# Patient Record
Sex: MALE | Race: WHITE
[De-identification: names, ages, dates, MRNs, and addresses within clinical notes are randomized per-mention and may not be internally consistent; named-entity substitution may affect disease eponyms.]

---

## 2017-10-04 ENCOUNTER — HOSPITAL ENCOUNTER (EMERGENCY)
Dept: HOSPITAL 8 - ED | Age: 57
Discharge: HOME | End: 2017-10-04
Payer: MEDICARE

## 2017-10-04 VITALS — HEIGHT: 73 IN | WEIGHT: 178.79 LBS | BODY MASS INDEX: 23.7 KG/M2

## 2017-10-04 VITALS — SYSTOLIC BLOOD PRESSURE: 130 MMHG | DIASTOLIC BLOOD PRESSURE: 77 MMHG

## 2017-10-04 DIAGNOSIS — F10.239: Primary | ICD-10-CM

## 2017-10-04 DIAGNOSIS — K70.10: ICD-10-CM

## 2017-10-04 DIAGNOSIS — F19.20: ICD-10-CM

## 2017-10-04 DIAGNOSIS — B18.2: ICD-10-CM

## 2017-10-04 LAB
AST SERPL-CCNC: 293 U/L (ref 15–37)
BUN SERPL-MCNC: 12 MG/DL (ref 7–18)
HCT VFR BLD CALC: 42.7 % (ref 39.2–51.8)
HGB BLD-MCNC: 14.9 G/DL (ref 13.7–18)
WBC # BLD AUTO: 6.1 X10^3/UL (ref 3.4–10)

## 2017-10-04 PROCEDURE — G0479 DRUG TEST PRESUMP NOT OPT: HCPCS

## 2017-10-04 PROCEDURE — 80053 COMPREHEN METABOLIC PANEL: CPT

## 2017-10-04 PROCEDURE — 80307 DRUG TEST PRSMV CHEM ANLYZR: CPT

## 2017-10-04 PROCEDURE — 36415 COLL VENOUS BLD VENIPUNCTURE: CPT

## 2017-10-04 PROCEDURE — 85025 COMPLETE CBC W/AUTO DIFF WBC: CPT

## 2017-10-04 PROCEDURE — 99284 EMERGENCY DEPT VISIT MOD MDM: CPT

## 2017-10-25 ENCOUNTER — HOSPITAL ENCOUNTER (EMERGENCY)
Dept: HOSPITAL 8 - ED | Age: 57
Discharge: HOME | End: 2017-10-25
Payer: MEDICARE

## 2017-10-25 VITALS — SYSTOLIC BLOOD PRESSURE: 147 MMHG | DIASTOLIC BLOOD PRESSURE: 89 MMHG

## 2017-10-25 DIAGNOSIS — G89.29: ICD-10-CM

## 2017-10-25 DIAGNOSIS — R10.9: ICD-10-CM

## 2017-10-25 DIAGNOSIS — F10.220: Primary | ICD-10-CM

## 2017-10-25 LAB
AST SERPL-CCNC: 225 U/L (ref 15–37)
BUN SERPL-MCNC: 10 MG/DL (ref 7–18)
HCT VFR BLD CALC: 44.3 % (ref 39.2–51.8)
HGB BLD-MCNC: 15.2 G/DL (ref 13.7–18)
WBC # BLD AUTO: 5.2 X10^3/UL (ref 3.4–10)

## 2017-10-25 PROCEDURE — 80053 COMPREHEN METABOLIC PANEL: CPT

## 2017-10-25 PROCEDURE — 83690 ASSAY OF LIPASE: CPT

## 2017-10-25 PROCEDURE — 99285 EMERGENCY DEPT VISIT HI MDM: CPT

## 2017-10-25 PROCEDURE — 96372 THER/PROPH/DIAG INJ SC/IM: CPT

## 2017-10-25 PROCEDURE — 85610 PROTHROMBIN TIME: CPT

## 2017-10-25 PROCEDURE — 36415 COLL VENOUS BLD VENIPUNCTURE: CPT

## 2017-10-25 PROCEDURE — 85025 COMPLETE CBC W/AUTO DIFF WBC: CPT

## 2017-10-25 PROCEDURE — 74020: CPT

## 2018-06-11 ENCOUNTER — HOSPITAL ENCOUNTER (EMERGENCY)
Dept: HOSPITAL 8 - ED | Age: 58
Discharge: HOME | End: 2018-06-11
Payer: MEDICARE

## 2018-06-11 VITALS — SYSTOLIC BLOOD PRESSURE: 146 MMHG | DIASTOLIC BLOOD PRESSURE: 90 MMHG

## 2018-06-11 VITALS — WEIGHT: 171.96 LBS | BODY MASS INDEX: 22.79 KG/M2 | HEIGHT: 73 IN

## 2018-06-11 VITALS — SYSTOLIC BLOOD PRESSURE: 110 MMHG | DIASTOLIC BLOOD PRESSURE: 67 MMHG

## 2018-06-11 VITALS — BODY MASS INDEX: 25.46 KG/M2 | WEIGHT: 198.42 LBS | HEIGHT: 74 IN

## 2018-06-11 DIAGNOSIS — F10.120: Primary | ICD-10-CM

## 2018-06-11 DIAGNOSIS — Z00.00: ICD-10-CM

## 2018-06-11 DIAGNOSIS — F10.229: Primary | ICD-10-CM

## 2018-06-11 LAB
ALBUMIN SERPL-MCNC: 3.1 G/DL (ref 3.4–5)
ALP SERPL-CCNC: 155 U/L (ref 45–117)
ALT SERPL-CCNC: 84 U/L (ref 12–78)
ANION GAP SERPL CALC-SCNC: 8 MMOL/L (ref 5–15)
BASOPHILS # BLD AUTO: 0.01 X10^3/UL (ref 0–0.1)
BASOPHILS NFR BLD AUTO: 0 % (ref 0–1)
BILIRUB SERPL-MCNC: 2.7 MG/DL (ref 0.2–1)
CALCIUM SERPL-MCNC: 8.1 MG/DL (ref 8.5–10.1)
CHLORIDE SERPL-SCNC: 104 MMOL/L (ref 98–107)
CREAT SERPL-MCNC: 0.79 MG/DL (ref 0.7–1.3)
EOSINOPHIL # BLD AUTO: 0.03 X10^3/UL (ref 0–0.4)
EOSINOPHIL NFR BLD AUTO: 1 % (ref 1–7)
ERYTHROCYTE [DISTWIDTH] IN BLOOD BY AUTOMATED COUNT: 18.3 % (ref 9.4–14.8)
HEMOGRAM NOTE: (no result)
LYMPHOCYTES # BLD AUTO: 0.86 X10^3/UL (ref 1–3.4)
LYMPHOCYTES NFR BLD AUTO: 29 % (ref 22–44)
MCH RBC QN AUTO: 27.8 PG (ref 27.5–34.5)
MCHC RBC AUTO-ENTMCNC: 33.9 G/DL (ref 33.2–36.2)
MCV RBC AUTO: 81.8 FL (ref 81–97)
MD: (no result)
MONOCYTES # BLD AUTO: 0.19 X10^3/UL (ref 0.2–0.8)
MONOCYTES NFR BLD AUTO: 6 % (ref 2–9)
NEUTROPHILS # BLD AUTO: 1.87 X10^3/UL (ref 1.8–6.8)
NEUTROPHILS NFR BLD AUTO: 63 % (ref 42–75)
PLATELET # BLD AUTO: 56 X10^3/UL (ref 130–400)
PMV BLD AUTO: 7.4 FL (ref 7.4–10.4)
PROT SERPL-MCNC: 7.7 G/DL (ref 6.4–8.2)
RBC # BLD AUTO: 4.2 X10^6/UL (ref 4.38–5.82)

## 2018-06-11 PROCEDURE — 36415 COLL VENOUS BLD VENIPUNCTURE: CPT

## 2018-06-11 PROCEDURE — 99284 EMERGENCY DEPT VISIT MOD MDM: CPT

## 2018-06-11 PROCEDURE — 99283 EMERGENCY DEPT VISIT LOW MDM: CPT

## 2018-06-11 PROCEDURE — 80307 DRUG TEST PRSMV CHEM ANLYZR: CPT

## 2018-06-11 PROCEDURE — 85025 COMPLETE CBC W/AUTO DIFF WBC: CPT

## 2018-06-11 PROCEDURE — 80053 COMPREHEN METABOLIC PANEL: CPT

## 2019-07-03 ENCOUNTER — HOSPITAL ENCOUNTER (INPATIENT)
Dept: HOSPITAL 8 - ED | Age: 59
Discharge: LEFT BEFORE DISCHARGE | DRG: 432 | End: 2019-07-03
Attending: INTERNAL MEDICINE | Admitting: INTERNAL MEDICINE
Payer: MEDICARE

## 2019-07-03 VITALS — SYSTOLIC BLOOD PRESSURE: 148 MMHG | DIASTOLIC BLOOD PRESSURE: 93 MMHG

## 2019-07-03 VITALS — DIASTOLIC BLOOD PRESSURE: 74 MMHG | SYSTOLIC BLOOD PRESSURE: 116 MMHG

## 2019-07-03 VITALS — BODY MASS INDEX: 21.69 KG/M2 | HEIGHT: 76 IN | WEIGHT: 178.13 LBS

## 2019-07-03 DIAGNOSIS — K70.30: ICD-10-CM

## 2019-07-03 DIAGNOSIS — D61.818: ICD-10-CM

## 2019-07-03 DIAGNOSIS — Z87.891: ICD-10-CM

## 2019-07-03 DIAGNOSIS — D68.4: ICD-10-CM

## 2019-07-03 DIAGNOSIS — Z53.21: ICD-10-CM

## 2019-07-03 DIAGNOSIS — E44.1: ICD-10-CM

## 2019-07-03 DIAGNOSIS — F10.239: ICD-10-CM

## 2019-07-03 DIAGNOSIS — B19.20: ICD-10-CM

## 2019-07-03 DIAGNOSIS — K70.40: Primary | ICD-10-CM

## 2019-07-03 DIAGNOSIS — G92: ICD-10-CM

## 2019-07-03 DIAGNOSIS — B19.10: ICD-10-CM

## 2019-07-03 LAB
<PLATELET ESTIMATE>: (no result)
<PLT MORPHOLOGY>: (no result)
ALBUMIN SERPL-MCNC: 2.7 G/DL (ref 3.4–5)
ALP SERPL-CCNC: 138 U/L (ref 45–117)
ALT SERPL-CCNC: 79 U/L (ref 12–78)
ANION GAP SERPL CALC-SCNC: 5 MMOL/L (ref 5–15)
APTT BLD: 29 SECONDS (ref 25–31)
BASOPHILS NFR BLD MANUAL: 1 % (ref 0–1)
BASOS#(MANUAL): 0.03 X10^3/UL (ref 0–0.1)
BILIRUB DIRECT SERPL-MCNC: NORMAL MG/DL
BILIRUB SERPL-MCNC: 1.1 MG/DL (ref 0.2–1)
CALCIUM SERPL-MCNC: 8.6 MG/DL (ref 8.5–10.1)
CHLORIDE SERPL-SCNC: 107 MMOL/L (ref 98–107)
CREAT SERPL-MCNC: 0.81 MG/DL (ref 0.7–1.3)
CULTURE INDICATED?: NO
EOS#(MANUAL): 0.05 X10^3/UL (ref 0–0.4)
EOS% (MANUAL): 2 % (ref 1–7)
ERYTHROCYTE [DISTWIDTH] IN BLOOD BY AUTOMATED COUNT: 16.8 % (ref 9.4–14.8)
INR PPP: 1.17 (ref 0.93–1.1)
LYMPH#(MANUAL): 0.76 X10^3/UL (ref 1–3.4)
LYMPHS% (MANUAL): 28 % (ref 22–44)
MCH RBC QN AUTO: 30.5 PG (ref 27.5–34.5)
MCHC RBC AUTO-ENTMCNC: 33.5 G/DL (ref 33.2–36.2)
MCV RBC AUTO: 91.1 FL (ref 81–97)
MD: YES
MICROSCOPIC: (no result)
MONOS#(MANUAL): 0.35 X10^3/UL (ref 0.3–2.7)
MONOS% (MANUAL): 13 % (ref 2–9)
PLATELET # BLD AUTO: 77 X10^3/UL (ref 130–400)
PMV BLD AUTO: 7.1 FL (ref 7.4–10.4)
PROT SERPL-MCNC: 6.9 G/DL (ref 6.4–8.2)
PROTHROMBIN TIME: 12.2 SECONDS (ref 9.6–11.5)
RBC # BLD AUTO: 4.32 X10^6/UL (ref 4.38–5.82)
SEG#(MANUAL): 1.51 X10^3/UL (ref 1.8–6.8)
SEGS% (MANUAL): 56 % (ref 42–75)

## 2019-07-03 PROCEDURE — 85025 COMPLETE CBC W/AUTO DIFF WBC: CPT

## 2019-07-03 PROCEDURE — 71045 X-RAY EXAM CHEST 1 VIEW: CPT

## 2019-07-03 PROCEDURE — 80053 COMPREHEN METABOLIC PANEL: CPT

## 2019-07-03 PROCEDURE — 83735 ASSAY OF MAGNESIUM: CPT

## 2019-07-03 PROCEDURE — 82140 ASSAY OF AMMONIA: CPT

## 2019-07-03 PROCEDURE — 83690 ASSAY OF LIPASE: CPT

## 2019-07-03 PROCEDURE — 76700 US EXAM ABDOM COMPLETE: CPT

## 2019-07-03 PROCEDURE — 93005 ELECTROCARDIOGRAM TRACING: CPT

## 2019-07-03 PROCEDURE — 85730 THROMBOPLASTIN TIME PARTIAL: CPT

## 2019-07-03 PROCEDURE — 70450 CT HEAD/BRAIN W/O DYE: CPT

## 2019-07-03 PROCEDURE — 83605 ASSAY OF LACTIC ACID: CPT

## 2019-07-03 PROCEDURE — 82962 GLUCOSE BLOOD TEST: CPT

## 2019-07-03 PROCEDURE — 81003 URINALYSIS AUTO W/O SCOPE: CPT

## 2019-07-03 PROCEDURE — 99285 EMERGENCY DEPT VISIT HI MDM: CPT

## 2019-07-03 PROCEDURE — 85610 PROTHROMBIN TIME: CPT

## 2019-07-03 PROCEDURE — 80307 DRUG TEST PRSMV CHEM ANLYZR: CPT

## 2019-07-03 PROCEDURE — 36415 COLL VENOUS BLD VENIPUNCTURE: CPT

## 2019-07-03 NOTE — NUR
PT OOB, WANDERING IN [place].  PT ASSISTED BACK TO ROOM.  URINE COLLECTED/SENT TO 
LAB.  PT ADVISED OF ADMIT ORDER.  PT PLACED BACK ON HEART MONITOR, BP CUFF, 
PULSE OX.  WARM BLANKET PROVIDED, CALL LIGHT WITHIN REACH.  PT BACK TO SLEEP.

## 2019-07-03 NOTE — NUR
REPORT FROM JORGE LUIS RN, ASSUME CARE OF PT AT THIS TIME.  PT SLEEPING BUT 
AROUSABLE TO VOICE. PT REQUESTING LIGHTS DOWN AND HOB UP, BOTH REQUESTS DONE.  
PT CONVERSIVE AND APPROPRIATE BUT VERY DROWSY.  PT UPDATED ON POC, INCLUDING NO 
GETTING OOB D/T DROWSINESS AND HIGH FALL RISK.  CALL LIGHT WITHIN REACH.

## 2019-07-03 NOTE — NUR
PT CRISTIANO ALEJANDRO FROM [facility] WHERE HE WAS ADMITTED ON 6/27 FOR DETOXING. 
PER STAFF AT [facility].,  PT WOKE UP ALTERED AND UNABLT TO AMBULATE.  PER 
[place], PT ABLE TO AMBULATE, BUT VERY DROWSY. PT WITH HX: END STAGE LIVER 
DISEASE. PT SLURRING WORDS AND VERY DROWSY. PT ALERT TO SELF ONLY. ASSESSMENT 
COMPLETED. 2 SIDE RAILS UP AND PT INSTRUCTED TO STAY IN BED. AWAITING MD. BLOOD 
GLUCOSE 131.

## 2019-07-24 ENCOUNTER — HOSPITAL ENCOUNTER (EMERGENCY)
Dept: HOSPITAL 8 - ED | Age: 59
Discharge: HOME | End: 2019-07-24
Payer: MEDICARE

## 2019-07-24 VITALS — HEIGHT: 73 IN | BODY MASS INDEX: 24.57 KG/M2 | WEIGHT: 185.39 LBS

## 2019-07-24 VITALS — DIASTOLIC BLOOD PRESSURE: 92 MMHG | SYSTOLIC BLOOD PRESSURE: 128 MMHG

## 2019-07-24 DIAGNOSIS — Z72.9: ICD-10-CM

## 2019-07-24 DIAGNOSIS — F12.20: ICD-10-CM

## 2019-07-24 DIAGNOSIS — N30.00: Primary | ICD-10-CM

## 2019-07-24 DIAGNOSIS — F10.20: ICD-10-CM

## 2019-07-24 DIAGNOSIS — R51: ICD-10-CM

## 2019-07-24 DIAGNOSIS — F15.20: ICD-10-CM

## 2019-07-24 DIAGNOSIS — Z86.19: ICD-10-CM

## 2019-07-24 PROCEDURE — 87086 URINE CULTURE/COLONY COUNT: CPT

## 2019-07-24 PROCEDURE — 80053 COMPREHEN METABOLIC PANEL: CPT

## 2019-07-24 PROCEDURE — 99284 EMERGENCY DEPT VISIT MOD MDM: CPT

## 2019-07-24 PROCEDURE — 70450 CT HEAD/BRAIN W/O DYE: CPT

## 2019-07-24 PROCEDURE — 36415 COLL VENOUS BLD VENIPUNCTURE: CPT

## 2019-07-24 PROCEDURE — 85610 PROTHROMBIN TIME: CPT

## 2019-07-24 PROCEDURE — 83690 ASSAY OF LIPASE: CPT

## 2019-07-24 PROCEDURE — 81001 URINALYSIS AUTO W/SCOPE: CPT

## 2019-07-24 PROCEDURE — 93005 ELECTROCARDIOGRAM TRACING: CPT

## 2019-07-24 PROCEDURE — 71045 X-RAY EXAM CHEST 1 VIEW: CPT

## 2019-07-24 PROCEDURE — 85730 THROMBOPLASTIN TIME PARTIAL: CPT

## 2019-07-24 PROCEDURE — 80307 DRUG TEST PRSMV CHEM ANLYZR: CPT

## 2019-07-24 PROCEDURE — 85025 COMPLETE CBC W/AUTO DIFF WBC: CPT

## 2019-07-24 PROCEDURE — 96374 THER/PROPH/DIAG INJ IV PUSH: CPT

## 2019-09-03 ENCOUNTER — HOSPITAL ENCOUNTER (INPATIENT)
Dept: HOSPITAL 8 - ED | Age: 59
LOS: 9 days | Discharge: HOME | DRG: 432 | End: 2019-09-12
Attending: FAMILY MEDICINE | Admitting: FAMILY MEDICINE
Payer: MEDICARE

## 2019-09-03 VITALS — HEIGHT: 73 IN | WEIGHT: 180.56 LBS | BODY MASS INDEX: 23.93 KG/M2

## 2019-09-03 DIAGNOSIS — E11.65: ICD-10-CM

## 2019-09-03 DIAGNOSIS — K80.20: ICD-10-CM
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
## 2019-09-03 DIAGNOSIS — K70.40: Primary | ICD-10-CM

## 2019-09-03 DIAGNOSIS — K70.30: ICD-10-CM

## 2019-09-03 DIAGNOSIS — Z87.820: ICD-10-CM

## 2019-09-03 DIAGNOSIS — H61.22: ICD-10-CM

## 2019-09-03 DIAGNOSIS — Z91.19: ICD-10-CM

## 2019-09-03 DIAGNOSIS — B19.20: ICD-10-CM

## 2019-09-03 DIAGNOSIS — F17.200: ICD-10-CM

## 2019-09-03 DIAGNOSIS — B19.10: ICD-10-CM

## 2019-09-03 DIAGNOSIS — F25.9: ICD-10-CM

## 2019-09-03 DIAGNOSIS — G93.41: ICD-10-CM

## 2019-09-03 DIAGNOSIS — I10: ICD-10-CM

## 2019-09-03 DIAGNOSIS — Z53.21: ICD-10-CM

## 2019-09-03 DIAGNOSIS — Z80.9: ICD-10-CM

## 2019-09-03 DIAGNOSIS — D72.819: ICD-10-CM

## 2019-09-03 DIAGNOSIS — F10.20: ICD-10-CM

## 2019-09-03 DIAGNOSIS — Z80.3: ICD-10-CM

## 2019-09-03 DIAGNOSIS — D68.4: ICD-10-CM

## 2019-09-03 DIAGNOSIS — Z82.5: ICD-10-CM

## 2019-09-03 DIAGNOSIS — E83.42: ICD-10-CM

## 2019-09-03 LAB
ALBUMIN SERPL-MCNC: 3.1 G/DL (ref 3.4–5)
ALP SERPL-CCNC: 131 U/L (ref 45–117)
ALT SERPL-CCNC: 69 U/L (ref 12–78)
ANION GAP SERPL CALC-SCNC: 4 MMOL/L (ref 5–15)
BASOPHILS # BLD AUTO: 0.01 X10^3/UL (ref 0–0.1)
BASOPHILS NFR BLD AUTO: 0 % (ref 0–1)
BILIRUB SERPL-MCNC: 2.3 MG/DL (ref 0.2–1)
CALCIUM SERPL-MCNC: 9.3 MG/DL (ref 8.5–10.1)
CHLORIDE SERPL-SCNC: 104 MMOL/L (ref 98–107)
CREAT SERPL-MCNC: 0.93 MG/DL (ref 0.7–1.3)
EOSINOPHIL # BLD AUTO: 0.11 X10^3/UL (ref 0–0.4)
EOSINOPHIL NFR BLD AUTO: 2 % (ref 1–7)
ERYTHROCYTE [DISTWIDTH] IN BLOOD BY AUTOMATED COUNT: 16.7 % (ref 9.4–14.8)
LYMPHOCYTES # BLD AUTO: 1.37 X10^3/UL (ref 1–3.4)
LYMPHOCYTES NFR BLD AUTO: 28 % (ref 22–44)
MCH RBC QN AUTO: 30.9 PG (ref 27.5–34.5)
MCHC RBC AUTO-ENTMCNC: 33.6 G/DL (ref 33.2–36.2)
MCV RBC AUTO: 91.8 FL (ref 81–97)
MD: (no result)
MONOCYTES # BLD AUTO: 0.46 X10^3/UL (ref 0.2–0.8)
MONOCYTES NFR BLD AUTO: 9 % (ref 2–9)
NEUTROPHILS # BLD AUTO: 2.99 X10^3/UL (ref 1.8–6.8)
NEUTROPHILS NFR BLD AUTO: 60 % (ref 42–75)
PLATELET # BLD AUTO: 91 X10^3/UL (ref 130–400)
PMV BLD AUTO: 7.3 FL (ref 7.4–10.4)
PROT SERPL-MCNC: 8 G/DL (ref 6.4–8.2)
RBC # BLD AUTO: 5.12 X10^6/UL (ref 4.38–5.82)

## 2019-09-03 PROCEDURE — 99285 EMERGENCY DEPT VISIT HI MDM: CPT

## 2019-09-03 PROCEDURE — 36415 COLL VENOUS BLD VENIPUNCTURE: CPT

## 2019-09-03 PROCEDURE — 80053 COMPREHEN METABOLIC PANEL: CPT

## 2019-09-03 PROCEDURE — 84100 ASSAY OF PHOSPHORUS: CPT

## 2019-09-03 PROCEDURE — 82140 ASSAY OF AMMONIA: CPT

## 2019-09-03 PROCEDURE — 83735 ASSAY OF MAGNESIUM: CPT

## 2019-09-03 PROCEDURE — 85025 COMPLETE CBC W/AUTO DIFF WBC: CPT

## 2019-09-03 PROCEDURE — 96374 THER/PROPH/DIAG INJ IV PUSH: CPT

## 2019-09-03 RX ADMIN — LACTULOSE SCH GM: 10 SOLUTION ORAL at 22:45

## 2019-09-03 RX ADMIN — RISPERIDONE SCH MG: 1 TABLET ORAL at 21:00

## 2019-09-03 NOTE — NUR
Hospitalist at bedside, admit orders to be placed. Labs drawn, pending results. 
Pt denies pain, vitals stable, resting intermitantly with eyes closed, no s/sx 
of distress noted.

## 2019-09-03 NOTE — NUR
Breathalyzer 0.0, MD informed. Pt remains aox4 with intermittent confusion, 
unable to give clear statements for complaint today or timeline of events for 
this week; recently admitted to facility and discharged. Pt vitals remain 
stable, denies pain at this time.
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
## 2019-09-04 VITALS — DIASTOLIC BLOOD PRESSURE: 72 MMHG | SYSTOLIC BLOOD PRESSURE: 102 MMHG

## 2019-09-04 VITALS — SYSTOLIC BLOOD PRESSURE: 102 MMHG | DIASTOLIC BLOOD PRESSURE: 66 MMHG

## 2019-09-04 VITALS — SYSTOLIC BLOOD PRESSURE: 135 MMHG | DIASTOLIC BLOOD PRESSURE: 82 MMHG

## 2019-09-04 VITALS — DIASTOLIC BLOOD PRESSURE: 68 MMHG | SYSTOLIC BLOOD PRESSURE: 104 MMHG

## 2019-09-04 LAB
ALBUMIN SERPL-MCNC: 3 G/DL (ref 3.4–5)
ALP SERPL-CCNC: 129 U/L (ref 45–117)
ALT SERPL-CCNC: 67 U/L (ref 12–78)
ANION GAP SERPL CALC-SCNC: 7 MMOL/L (ref 5–15)
BASOPHILS # BLD AUTO: 0.01 X10^3/UL (ref 0–0.1)
BASOPHILS NFR BLD AUTO: 0 % (ref 0–1)
BILIRUB SERPL-MCNC: 2.3 MG/DL (ref 0.2–1)
CALCIUM SERPL-MCNC: 9 MG/DL (ref 8.5–10.1)
CHLORIDE SERPL-SCNC: 105 MMOL/L (ref 98–107)
CREAT SERPL-MCNC: 0.7 MG/DL (ref 0.7–1.3)
EOSINOPHIL # BLD AUTO: 0.16 X10^3/UL (ref 0–0.4)
EOSINOPHIL NFR BLD AUTO: 3 % (ref 1–7)
ERYTHROCYTE [DISTWIDTH] IN BLOOD BY AUTOMATED COUNT: 16.8 % (ref 9.4–14.8)
LYMPHOCYTES # BLD AUTO: 1.68 X10^3/UL (ref 1–3.4)
LYMPHOCYTES NFR BLD AUTO: 34 % (ref 22–44)
MCH RBC QN AUTO: 30.7 PG (ref 27.5–34.5)
MCHC RBC AUTO-ENTMCNC: 33.9 G/DL (ref 33.2–36.2)
MCV RBC AUTO: 90.7 FL (ref 81–97)
MD: (no result)
MONOCYTES # BLD AUTO: 0.47 X10^3/UL (ref 0.2–0.8)
MONOCYTES NFR BLD AUTO: 9 % (ref 2–9)
NEUTROPHILS # BLD AUTO: 2.64 X10^3/UL (ref 1.8–6.8)
NEUTROPHILS NFR BLD AUTO: 53 % (ref 42–75)
PLATELET # BLD AUTO: 88 X10^3/UL (ref 130–400)
PMV BLD AUTO: 7.4 FL (ref 7.4–10.4)
PROT SERPL-MCNC: 7.6 G/DL (ref 6.4–8.2)
RBC # BLD AUTO: 5.13 X10^6/UL (ref 4.38–5.82)

## 2019-09-04 RX ADMIN — HYDROCODONE BITARTRATE AND ACETAMINOPHEN PRN TAB: 10; 325 TABLET ORAL at 16:45

## 2019-09-04 RX ADMIN — HYDROCODONE BITARTRATE AND ACETAMINOPHEN PRN TAB: 10; 325 TABLET ORAL at 22:09

## 2019-09-04 RX ADMIN — ONDANSETRON PRN MG: 2 INJECTION, SOLUTION INTRAMUSCULAR; INTRAVENOUS at 20:30

## 2019-09-04 RX ADMIN — LACTULOSE SCH GM: 10 SOLUTION ORAL at 16:45

## 2019-09-04 RX ADMIN — LACTULOSE SCH GM: 10 SOLUTION ORAL at 20:21

## 2019-09-04 RX ADMIN — FOLIC ACID SCH MG: 1 TABLET ORAL at 09:26

## 2019-09-04 RX ADMIN — Medication SCH MG: at 09:27

## 2019-09-04 RX ADMIN — NICOTINE SCH PATCH: 21 PATCH, EXTENDED RELEASE TRANSDERMAL at 09:28

## 2019-09-04 RX ADMIN — RISPERIDONE SCH MG: 1 TABLET ORAL at 09:00

## 2019-09-04 RX ADMIN — LACTULOSE SCH GM: 10 SOLUTION ORAL at 09:26

## 2019-09-04 RX ADMIN — HYDROCODONE BITARTRATE AND ACETAMINOPHEN PRN TAB: 10; 325 TABLET ORAL at 09:27

## 2019-09-04 RX ADMIN — RISPERIDONE SCH MG: 1 TABLET ORAL at 20:21

## 2019-09-04 RX ADMIN — Medication SCH TAB: at 09:27

## 2019-09-05 VITALS — DIASTOLIC BLOOD PRESSURE: 70 MMHG | SYSTOLIC BLOOD PRESSURE: 113 MMHG

## 2019-09-05 VITALS — DIASTOLIC BLOOD PRESSURE: 78 MMHG | SYSTOLIC BLOOD PRESSURE: 134 MMHG

## 2019-09-05 VITALS — DIASTOLIC BLOOD PRESSURE: 80 MMHG | SYSTOLIC BLOOD PRESSURE: 126 MMHG

## 2019-09-05 VITALS — SYSTOLIC BLOOD PRESSURE: 132 MMHG | DIASTOLIC BLOOD PRESSURE: 78 MMHG

## 2019-09-05 RX ADMIN — Medication SCH TAB: at 08:53

## 2019-09-05 RX ADMIN — NICOTINE SCH PATCH: 21 PATCH, EXTENDED RELEASE TRANSDERMAL at 08:55

## 2019-09-05 RX ADMIN — RISPERIDONE SCH MG: 1 TABLET ORAL at 20:17

## 2019-09-05 RX ADMIN — HYDROCODONE BITARTRATE AND ACETAMINOPHEN PRN TAB: 10; 325 TABLET ORAL at 22:20

## 2019-09-05 RX ADMIN — LACTULOSE SCH GM: 10 SOLUTION ORAL at 08:54

## 2019-09-05 RX ADMIN — LACTULOSE SCH GM: 10 SOLUTION ORAL at 20:17

## 2019-09-05 RX ADMIN — RISPERIDONE SCH MG: 1 TABLET ORAL at 08:53

## 2019-09-05 RX ADMIN — LACTULOSE SCH GM: 10 SOLUTION ORAL at 17:20

## 2019-09-05 RX ADMIN — Medication SCH MG: at 08:53

## 2019-09-05 RX ADMIN — FOLIC ACID SCH MG: 1 TABLET ORAL at 08:54

## 2019-09-05 RX ADMIN — ONDANSETRON PRN MG: 2 INJECTION, SOLUTION INTRAMUSCULAR; INTRAVENOUS at 18:12

## 2019-09-05 RX ADMIN — HYDROCODONE BITARTRATE AND ACETAMINOPHEN PRN TAB: 10; 325 TABLET ORAL at 14:45

## 2019-09-05 RX ADMIN — HYDROCODONE BITARTRATE AND ACETAMINOPHEN PRN TAB: 10; 325 TABLET ORAL at 06:47

## 2019-09-06 VITALS — SYSTOLIC BLOOD PRESSURE: 145 MMHG | DIASTOLIC BLOOD PRESSURE: 83 MMHG

## 2019-09-06 VITALS — SYSTOLIC BLOOD PRESSURE: 124 MMHG | DIASTOLIC BLOOD PRESSURE: 78 MMHG

## 2019-09-06 VITALS — DIASTOLIC BLOOD PRESSURE: 70 MMHG | SYSTOLIC BLOOD PRESSURE: 133 MMHG

## 2019-09-06 VITALS — DIASTOLIC BLOOD PRESSURE: 72 MMHG | SYSTOLIC BLOOD PRESSURE: 117 MMHG

## 2019-09-06 RX ADMIN — RISPERIDONE SCH MG: 1 TABLET ORAL at 07:37

## 2019-09-06 RX ADMIN — FOLIC ACID SCH MG: 1 TABLET ORAL at 07:34

## 2019-09-06 RX ADMIN — NICOTINE SCH PATCH: 21 PATCH, EXTENDED RELEASE TRANSDERMAL at 07:36

## 2019-09-06 RX ADMIN — HYDROCODONE BITARTRATE AND ACETAMINOPHEN PRN TAB: 10; 325 TABLET ORAL at 21:43

## 2019-09-06 RX ADMIN — LACTULOSE SCH GM: 10 SOLUTION ORAL at 07:34

## 2019-09-06 RX ADMIN — HYDROCODONE BITARTRATE AND ACETAMINOPHEN PRN TAB: 10; 325 TABLET ORAL at 15:32

## 2019-09-06 RX ADMIN — Medication SCH TAB: at 07:34

## 2019-09-06 RX ADMIN — LACTULOSE SCH GM: 10 SOLUTION ORAL at 20:33

## 2019-09-06 RX ADMIN — NICOTINE SCH PATCH: 21 PATCH, EXTENDED RELEASE TRANSDERMAL at 07:43

## 2019-09-06 RX ADMIN — LACTULOSE SCH GM: 10 SOLUTION ORAL at 15:33

## 2019-09-06 RX ADMIN — HYDROCODONE BITARTRATE AND ACETAMINOPHEN PRN TAB: 10; 325 TABLET ORAL at 07:34

## 2019-09-06 RX ADMIN — Medication SCH MG: at 07:35

## 2019-09-07 VITALS — DIASTOLIC BLOOD PRESSURE: 67 MMHG | SYSTOLIC BLOOD PRESSURE: 175 MMHG

## 2019-09-07 VITALS — SYSTOLIC BLOOD PRESSURE: 107 MMHG | DIASTOLIC BLOOD PRESSURE: 69 MMHG

## 2019-09-07 VITALS — DIASTOLIC BLOOD PRESSURE: 82 MMHG | SYSTOLIC BLOOD PRESSURE: 139 MMHG

## 2019-09-07 VITALS — SYSTOLIC BLOOD PRESSURE: 135 MMHG | DIASTOLIC BLOOD PRESSURE: 80 MMHG

## 2019-09-07 RX ADMIN — FOLIC ACID SCH MG: 1 TABLET ORAL at 07:28

## 2019-09-07 RX ADMIN — TRAZODONE HYDROCHLORIDE PRN MG: 100 TABLET, FILM COATED ORAL at 23:31

## 2019-09-07 RX ADMIN — Medication SCH MG: at 07:28

## 2019-09-07 RX ADMIN — ARIPIPRAZOLE SCH MG: 5 TABLET ORAL at 07:28

## 2019-09-07 RX ADMIN — Medication SCH TAB: at 07:28

## 2019-09-07 RX ADMIN — NICOTINE SCH PATCH: 21 PATCH, EXTENDED RELEASE TRANSDERMAL at 07:29

## 2019-09-07 RX ADMIN — NICOTINE SCH PATCH: 21 PATCH, EXTENDED RELEASE TRANSDERMAL at 07:39

## 2019-09-07 RX ADMIN — LACTULOSE SCH GM: 10 SOLUTION ORAL at 16:31

## 2019-09-07 RX ADMIN — HYDROCODONE BITARTRATE AND ACETAMINOPHEN PRN TAB: 10; 325 TABLET ORAL at 13:34

## 2019-09-07 RX ADMIN — LACTULOSE SCH GM: 10 SOLUTION ORAL at 20:37

## 2019-09-07 RX ADMIN — OXYCODONE HYDROCHLORIDE PRN MG: 5 TABLET ORAL at 19:34

## 2019-09-07 RX ADMIN — HYDROCODONE BITARTRATE AND ACETAMINOPHEN PRN TAB: 10; 325 TABLET ORAL at 07:29

## 2019-09-07 RX ADMIN — LACTULOSE SCH GM: 10 SOLUTION ORAL at 07:29

## 2019-09-08 VITALS — DIASTOLIC BLOOD PRESSURE: 60 MMHG | SYSTOLIC BLOOD PRESSURE: 95 MMHG

## 2019-09-08 VITALS — DIASTOLIC BLOOD PRESSURE: 72 MMHG | SYSTOLIC BLOOD PRESSURE: 128 MMHG

## 2019-09-08 VITALS — SYSTOLIC BLOOD PRESSURE: 117 MMHG | DIASTOLIC BLOOD PRESSURE: 70 MMHG

## 2019-09-08 VITALS — SYSTOLIC BLOOD PRESSURE: 102 MMHG | DIASTOLIC BLOOD PRESSURE: 70 MMHG

## 2019-09-08 VITALS — DIASTOLIC BLOOD PRESSURE: 82 MMHG | SYSTOLIC BLOOD PRESSURE: 138 MMHG

## 2019-09-08 RX ADMIN — TRAZODONE HYDROCHLORIDE PRN MG: 100 TABLET, FILM COATED ORAL at 20:02

## 2019-09-08 RX ADMIN — FOLIC ACID SCH MG: 1 TABLET ORAL at 07:48

## 2019-09-08 RX ADMIN — OXYCODONE HYDROCHLORIDE PRN MG: 5 TABLET ORAL at 01:44

## 2019-09-08 RX ADMIN — OXYCODONE HYDROCHLORIDE PRN MG: 5 TABLET ORAL at 20:01

## 2019-09-08 RX ADMIN — Medication SCH MG: at 07:47

## 2019-09-08 RX ADMIN — TRAZODONE HYDROCHLORIDE PRN MG: 100 TABLET, FILM COATED ORAL at 22:35

## 2019-09-08 RX ADMIN — OXYCODONE HYDROCHLORIDE PRN MG: 5 TABLET ORAL at 13:50

## 2019-09-08 RX ADMIN — OXYCODONE HYDROCHLORIDE PRN MG: 5 TABLET ORAL at 07:47

## 2019-09-08 RX ADMIN — LACTULOSE SCH GM: 10 SOLUTION ORAL at 20:05

## 2019-09-08 RX ADMIN — ARIPIPRAZOLE SCH MG: 5 TABLET ORAL at 07:47

## 2019-09-08 RX ADMIN — LACTULOSE SCH GM: 10 SOLUTION ORAL at 16:40

## 2019-09-08 RX ADMIN — NICOTINE SCH PATCH: 21 PATCH, EXTENDED RELEASE TRANSDERMAL at 07:26

## 2019-09-08 RX ADMIN — Medication SCH TAB: at 07:48

## 2019-09-08 RX ADMIN — LACTULOSE SCH GM: 10 SOLUTION ORAL at 07:48

## 2019-09-09 VITALS — DIASTOLIC BLOOD PRESSURE: 72 MMHG | SYSTOLIC BLOOD PRESSURE: 125 MMHG

## 2019-09-09 VITALS — DIASTOLIC BLOOD PRESSURE: 56 MMHG | SYSTOLIC BLOOD PRESSURE: 95 MMHG

## 2019-09-09 VITALS — DIASTOLIC BLOOD PRESSURE: 76 MMHG | SYSTOLIC BLOOD PRESSURE: 117 MMHG

## 2019-09-09 VITALS — DIASTOLIC BLOOD PRESSURE: 54 MMHG | SYSTOLIC BLOOD PRESSURE: 105 MMHG

## 2019-09-09 RX ADMIN — LACTULOSE SCH GM: 10 SOLUTION ORAL at 09:56

## 2019-09-09 RX ADMIN — FOLIC ACID SCH MG: 1 TABLET ORAL at 09:56

## 2019-09-09 RX ADMIN — OXYCODONE HYDROCHLORIDE PRN MG: 5 TABLET ORAL at 11:09

## 2019-09-09 RX ADMIN — Medication SCH MG: at 09:56

## 2019-09-09 RX ADMIN — NICOTINE SCH PATCH: 21 PATCH, EXTENDED RELEASE TRANSDERMAL at 09:57

## 2019-09-09 RX ADMIN — NICOTINE SCH PATCH: 21 PATCH, EXTENDED RELEASE TRANSDERMAL at 09:00

## 2019-09-09 RX ADMIN — TRAZODONE HYDROCHLORIDE PRN MG: 100 TABLET, FILM COATED ORAL at 22:05

## 2019-09-09 RX ADMIN — Medication SCH TAB: at 09:56

## 2019-09-09 RX ADMIN — ARIPIPRAZOLE SCH MG: 5 TABLET ORAL at 09:56

## 2019-09-09 RX ADMIN — OXYCODONE HYDROCHLORIDE PRN MG: 5 TABLET ORAL at 10:01

## 2019-09-09 RX ADMIN — LACTULOSE SCH GM: 10 SOLUTION ORAL at 20:12

## 2019-09-09 RX ADMIN — OXYCODONE HYDROCHLORIDE PRN MG: 5 TABLET ORAL at 17:03

## 2019-09-09 RX ADMIN — LACTULOSE SCH GM: 10 SOLUTION ORAL at 17:03

## 2019-09-10 VITALS — DIASTOLIC BLOOD PRESSURE: 73 MMHG | SYSTOLIC BLOOD PRESSURE: 114 MMHG

## 2019-09-10 VITALS — DIASTOLIC BLOOD PRESSURE: 72 MMHG | SYSTOLIC BLOOD PRESSURE: 114 MMHG

## 2019-09-10 VITALS — SYSTOLIC BLOOD PRESSURE: 105 MMHG | DIASTOLIC BLOOD PRESSURE: 67 MMHG

## 2019-09-10 VITALS — DIASTOLIC BLOOD PRESSURE: 70 MMHG | SYSTOLIC BLOOD PRESSURE: 106 MMHG

## 2019-09-10 RX ADMIN — LACTULOSE SCH GM: 10 SOLUTION ORAL at 20:35

## 2019-09-10 RX ADMIN — ARIPIPRAZOLE SCH MG: 5 TABLET ORAL at 09:12

## 2019-09-10 RX ADMIN — TRAZODONE HYDROCHLORIDE PRN MG: 100 TABLET, FILM COATED ORAL at 20:34

## 2019-09-10 RX ADMIN — OXYCODONE HYDROCHLORIDE PRN MG: 5 TABLET ORAL at 20:34

## 2019-09-10 RX ADMIN — OXYCODONE HYDROCHLORIDE PRN MG: 5 TABLET ORAL at 09:12

## 2019-09-10 RX ADMIN — LACTULOSE SCH GM: 10 SOLUTION ORAL at 09:12

## 2019-09-10 RX ADMIN — Medication SCH TAB: at 09:12

## 2019-09-10 RX ADMIN — FOLIC ACID SCH MG: 1 TABLET ORAL at 09:12

## 2019-09-10 RX ADMIN — Medication SCH MG: at 09:12

## 2019-09-10 RX ADMIN — LACTULOSE SCH GM: 10 SOLUTION ORAL at 15:58

## 2019-09-10 RX ADMIN — OXYCODONE HYDROCHLORIDE PRN MG: 5 TABLET ORAL at 15:58

## 2019-09-11 VITALS — SYSTOLIC BLOOD PRESSURE: 96 MMHG | DIASTOLIC BLOOD PRESSURE: 60 MMHG

## 2019-09-11 VITALS — SYSTOLIC BLOOD PRESSURE: 112 MMHG | DIASTOLIC BLOOD PRESSURE: 68 MMHG

## 2019-09-11 VITALS — SYSTOLIC BLOOD PRESSURE: 109 MMHG | DIASTOLIC BLOOD PRESSURE: 63 MMHG

## 2019-09-11 VITALS — SYSTOLIC BLOOD PRESSURE: 109 MMHG | DIASTOLIC BLOOD PRESSURE: 68 MMHG

## 2019-09-11 RX ADMIN — Medication SCH MG: at 12:09

## 2019-09-11 RX ADMIN — Medication SCH TAB: at 12:09

## 2019-09-11 RX ADMIN — LACTULOSE SCH GM: 10 SOLUTION ORAL at 19:49

## 2019-09-11 RX ADMIN — OXYCODONE HYDROCHLORIDE PRN MG: 5 TABLET ORAL at 21:22

## 2019-09-11 RX ADMIN — FOLIC ACID SCH MG: 1 TABLET ORAL at 12:09

## 2019-09-11 RX ADMIN — OXYCODONE HYDROCHLORIDE PRN MG: 5 TABLET ORAL at 12:53

## 2019-09-11 RX ADMIN — LACTULOSE SCH GM: 10 SOLUTION ORAL at 17:52

## 2019-09-11 RX ADMIN — TRAZODONE HYDROCHLORIDE PRN MG: 100 TABLET, FILM COATED ORAL at 19:49

## 2019-09-11 RX ADMIN — LACTULOSE SCH GM: 10 SOLUTION ORAL at 12:09

## 2019-09-11 RX ADMIN — ARIPIPRAZOLE SCH MG: 5 TABLET ORAL at 12:09

## 2019-09-12 VITALS — DIASTOLIC BLOOD PRESSURE: 63 MMHG | SYSTOLIC BLOOD PRESSURE: 107 MMHG

## 2019-09-12 VITALS — DIASTOLIC BLOOD PRESSURE: 61 MMHG | SYSTOLIC BLOOD PRESSURE: 100 MMHG

## 2019-09-12 VITALS — SYSTOLIC BLOOD PRESSURE: 114 MMHG | DIASTOLIC BLOOD PRESSURE: 66 MMHG

## 2019-09-12 RX ADMIN — LACTULOSE SCH GM: 10 SOLUTION ORAL at 15:44

## 2019-09-12 RX ADMIN — LACTULOSE SCH GM: 10 SOLUTION ORAL at 08:12

## 2019-09-12 RX ADMIN — Medication SCH MG: at 08:12

## 2019-09-12 RX ADMIN — FOLIC ACID SCH MG: 1 TABLET ORAL at 08:13

## 2019-09-12 RX ADMIN — ARIPIPRAZOLE SCH MG: 5 TABLET ORAL at 08:12

## 2019-09-12 RX ADMIN — Medication SCH TAB: at 08:12

## 2019-09-12 RX ADMIN — OXYCODONE HYDROCHLORIDE PRN MG: 5 TABLET ORAL at 08:13

## 2020-02-15 ENCOUNTER — HOSPITAL ENCOUNTER (EMERGENCY)
Dept: HOSPITAL 8 - ED | Age: 60
LOS: 1 days | Discharge: HOME | End: 2020-02-16
Payer: MEDICAID

## 2020-02-15 VITALS — WEIGHT: 185.19 LBS | BODY MASS INDEX: 24.54 KG/M2 | HEIGHT: 73 IN

## 2020-02-15 VITALS — DIASTOLIC BLOOD PRESSURE: 83 MMHG | SYSTOLIC BLOOD PRESSURE: 110 MMHG

## 2020-02-15 DIAGNOSIS — F41.9: Primary | ICD-10-CM

## 2020-02-15 DIAGNOSIS — F32.9: ICD-10-CM

## 2020-02-15 LAB
BASOPHILS # BLD AUTO: 0.02 X10^3/UL (ref 0–0.1)
BASOPHILS NFR BLD AUTO: 0 % (ref 0–1)
EOSINOPHIL # BLD AUTO: 0.07 X10^3/UL (ref 0–0.4)
EOSINOPHIL NFR BLD AUTO: 1 % (ref 1–7)
ERYTHROCYTE [DISTWIDTH] IN BLOOD BY AUTOMATED COUNT: 15.7 % (ref 9.4–14.8)
LYMPHOCYTES # BLD AUTO: 1.37 X10^3/UL (ref 1–3.4)
LYMPHOCYTES NFR BLD AUTO: 26 % (ref 22–44)
MCH RBC QN AUTO: 31.7 PG (ref 27.5–34.5)
MCHC RBC AUTO-ENTMCNC: 34.2 G/DL (ref 33.2–36.2)
MCV RBC AUTO: 92.7 FL (ref 81–97)
MD: NO
MONOCYTES # BLD AUTO: 0.47 X10^3/UL (ref 0.2–0.8)
MONOCYTES NFR BLD AUTO: 9 % (ref 2–9)
NEUTROPHILS # BLD AUTO: 3.41 X10^3/UL (ref 1.8–6.8)
NEUTROPHILS NFR BLD AUTO: 64 % (ref 42–75)
PLATELET # BLD AUTO: 110 X10^3/UL (ref 130–400)
PMV BLD AUTO: 7.4 FL (ref 7.4–10.4)
RBC # BLD AUTO: 4.89 X10^6/UL (ref 4.38–5.82)

## 2020-02-15 PROCEDURE — 36415 COLL VENOUS BLD VENIPUNCTURE: CPT

## 2020-02-15 PROCEDURE — 99283 EMERGENCY DEPT VISIT LOW MDM: CPT

## 2020-02-15 PROCEDURE — 82140 ASSAY OF AMMONIA: CPT

## 2020-02-15 PROCEDURE — 85025 COMPLETE CBC W/AUTO DIFF WBC: CPT

## 2020-02-15 PROCEDURE — 80307 DRUG TEST PRSMV CHEM ANLYZR: CPT

## 2020-02-15 PROCEDURE — 80053 COMPREHEN METABOLIC PANEL: CPT

## 2020-02-15 NOTE — NUR
Pt came out of room dressed carrying his duffelbag stating he was leaving. When 
asked to go back to his room pt stood in the hallway continuing to perseverate 
over the MD walking out of the room during his exam. Pt was eventually cleared 
by MD to leave and was directed to the ER lobby.

## 2020-02-16 LAB
ALBUMIN SERPL-MCNC: 3.1 G/DL (ref 3.4–5)
ALP SERPL-CCNC: 178 U/L (ref 45–117)
ALT SERPL-CCNC: 70 U/L (ref 12–78)
ANION GAP SERPL CALC-SCNC: 7 MMOL/L (ref 5–15)
BILIRUB SERPL-MCNC: 1.5 MG/DL (ref 0.2–1)
CALCIUM SERPL-MCNC: 8.9 MG/DL (ref 8.5–10.1)
CHLORIDE SERPL-SCNC: 107 MMOL/L (ref 98–107)
CREAT SERPL-MCNC: 0.7 MG/DL (ref 0.7–1.3)
PROT SERPL-MCNC: 8.2 G/DL (ref 6.4–8.2)
VANCOMYCIN TROUGH SERPL-MCNC: < 1.7 MG/DL (ref 2.8–20)

## 2020-03-09 ENCOUNTER — HOSPITAL ENCOUNTER (EMERGENCY)
Dept: HOSPITAL 8 - ED | Age: 60
Discharge: HOME | End: 2020-03-09
Payer: MEDICARE

## 2020-03-09 VITALS — BODY MASS INDEX: 23.96 KG/M2 | HEIGHT: 73 IN | WEIGHT: 180.78 LBS

## 2020-03-09 VITALS — SYSTOLIC BLOOD PRESSURE: 127 MMHG | DIASTOLIC BLOOD PRESSURE: 80 MMHG

## 2020-03-09 DIAGNOSIS — Z98.890: ICD-10-CM

## 2020-03-09 DIAGNOSIS — E72.20: ICD-10-CM

## 2020-03-09 DIAGNOSIS — K72.90: Primary | ICD-10-CM

## 2020-03-09 LAB
ALBUMIN SERPL-MCNC: 2.5 G/DL (ref 3.4–5)
ALP SERPL-CCNC: 144 U/L (ref 45–117)
ALT SERPL-CCNC: 103 U/L (ref 12–78)
ANION GAP SERPL CALC-SCNC: 7 MMOL/L (ref 5–15)
APTT BLD: 28 SECONDS (ref 25–31)
BASOPHILS # BLD AUTO: 0.02 X10^3/UL (ref 0–0.1)
BASOPHILS NFR BLD AUTO: 0 % (ref 0–1)
BILIRUB SERPL-MCNC: 1.2 MG/DL (ref 0.2–1)
CALCIUM SERPL-MCNC: 8.6 MG/DL (ref 8.5–10.1)
CHLORIDE SERPL-SCNC: 107 MMOL/L (ref 98–107)
CREAT SERPL-MCNC: 0.78 MG/DL (ref 0.7–1.3)
EOSINOPHIL # BLD AUTO: 0.1 X10^3/UL (ref 0–0.4)
EOSINOPHIL NFR BLD AUTO: 3 % (ref 1–7)
ERYTHROCYTE [DISTWIDTH] IN BLOOD BY AUTOMATED COUNT: 15.5 % (ref 9.4–14.8)
INR PPP: 1.13 (ref 0.93–1.1)
LYMPHOCYTES # BLD AUTO: 1.01 X10^3/UL (ref 1–3.4)
LYMPHOCYTES NFR BLD AUTO: 26 % (ref 22–44)
MCH RBC QN AUTO: 31.8 PG (ref 27.5–34.5)
MCHC RBC AUTO-ENTMCNC: 34.1 G/DL (ref 33.2–36.2)
MCV RBC AUTO: 93.5 FL (ref 81–97)
MD: NO
MONOCYTES # BLD AUTO: 0.27 X10^3/UL (ref 0.2–0.8)
MONOCYTES NFR BLD AUTO: 7 % (ref 2–9)
NEUTROPHILS # BLD AUTO: 2.44 X10^3/UL (ref 1.8–6.8)
NEUTROPHILS NFR BLD AUTO: 64 % (ref 42–75)
PLATELET # BLD AUTO: 100 X10^3/UL (ref 130–400)
PMV BLD AUTO: 7.3 FL (ref 7.4–10.4)
PROT SERPL-MCNC: 7.4 G/DL (ref 6.4–8.2)
PROTHROMBIN TIME: 12 SECONDS (ref 9.6–11.5)
RBC # BLD AUTO: 4.72 X10^6/UL (ref 4.38–5.82)

## 2020-03-09 PROCEDURE — 83690 ASSAY OF LIPASE: CPT

## 2020-03-09 PROCEDURE — 82140 ASSAY OF AMMONIA: CPT

## 2020-03-09 PROCEDURE — 85025 COMPLETE CBC W/AUTO DIFF WBC: CPT

## 2020-03-09 PROCEDURE — 36415 COLL VENOUS BLD VENIPUNCTURE: CPT

## 2020-03-09 PROCEDURE — 85610 PROTHROMBIN TIME: CPT

## 2020-03-09 PROCEDURE — 80053 COMPREHEN METABOLIC PANEL: CPT

## 2020-03-09 PROCEDURE — 99283 EMERGENCY DEPT VISIT LOW MDM: CPT

## 2020-03-09 PROCEDURE — 85730 THROMBOPLASTIN TIME PARTIAL: CPT

## 2020-03-09 NOTE — NUR
Patient & H staff given discharge instructions and Rx, they have confirmed 
that they understand the instructions.  Patient ambulatory with steady gait.

## 2020-03-09 NOTE — NUR
PT UPRIGHT ON GURNEY WITH EYES CLOSED, RESPONDS APPROP TO STAFF, NAD- ABLE TO 
DOZE OFF, COMFORT MEASURES PROVIDED, [facility] STAFF AT , CALL LIGHT WITHIN REACH.

## 2020-04-22 ENCOUNTER — HOSPITAL ENCOUNTER (EMERGENCY)
Dept: HOSPITAL 8 - ED | Age: 60
Discharge: HOME | End: 2020-04-22
Payer: MEDICARE

## 2020-04-22 VITALS — HEIGHT: 73 IN | WEIGHT: 187.39 LBS | BODY MASS INDEX: 24.84 KG/M2

## 2020-04-22 VITALS — SYSTOLIC BLOOD PRESSURE: 124 MMHG | DIASTOLIC BLOOD PRESSURE: 75 MMHG
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
## 2020-04-22 DIAGNOSIS — S20.212A: Primary | ICD-10-CM
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
## 2020-04-22 DIAGNOSIS — R07.89: ICD-10-CM
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
## 2020-04-22 PROCEDURE — 99283 EMERGENCY DEPT VISIT LOW MDM: CPT

## 2020-04-22 PROCEDURE — 93005 ELECTROCARDIOGRAM TRACING: CPT
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
## 2020-08-27 ENCOUNTER — HOSPITAL ENCOUNTER (EMERGENCY)
Dept: HOSPITAL 8 - ED | Age: 60
Discharge: HOME | End: 2020-08-27
Payer: MEDICARE

## 2020-08-27 VITALS — HEIGHT: 73 IN | BODY MASS INDEX: 23.24 KG/M2 | WEIGHT: 175.38 LBS

## 2020-08-27 VITALS — DIASTOLIC BLOOD PRESSURE: 73 MMHG | SYSTOLIC BLOOD PRESSURE: 119 MMHG

## 2020-08-27 DIAGNOSIS — R51: ICD-10-CM

## 2020-08-27 DIAGNOSIS — M79.10: ICD-10-CM

## 2020-08-27 DIAGNOSIS — R42: Primary | ICD-10-CM

## 2020-08-27 DIAGNOSIS — I51.7: ICD-10-CM

## 2020-08-27 LAB
ALBUMIN SERPL-MCNC: 2.8 G/DL (ref 3.4–5)
ALP SERPL-CCNC: 177 U/L (ref 45–117)
ALT SERPL-CCNC: 87 U/L (ref 12–78)
ANION GAP SERPL CALC-SCNC: 6 MMOL/L (ref 5–15)
APTT BLD: 28 SECONDS (ref 25–31)
BASOPHILS # BLD AUTO: 0.01 X10^3/UL (ref 0–0.1)
BASOPHILS NFR BLD AUTO: 0 % (ref 0–1)
BILIRUB SERPL-MCNC: 0.8 MG/DL (ref 0.2–1)
CALCIUM SERPL-MCNC: 8.7 MG/DL (ref 8.5–10.1)
CHLORIDE SERPL-SCNC: 110 MMOL/L (ref 98–107)
CREAT SERPL-MCNC: 0.53 MG/DL (ref 0.7–1.3)
EOSINOPHIL # BLD AUTO: 0.11 X10^3/UL (ref 0–0.4)
EOSINOPHIL NFR BLD AUTO: 3 % (ref 1–7)
ERYTHROCYTE [DISTWIDTH] IN BLOOD BY AUTOMATED COUNT: 15.2 % (ref 9.4–14.8)
INR PPP: 1.14 (ref 0.93–1.1)
LYMPHOCYTES # BLD AUTO: 1.06 X10^3/UL (ref 1–3.4)
LYMPHOCYTES NFR BLD AUTO: 29 % (ref 22–44)
MCH RBC QN AUTO: 31.4 PG (ref 27.5–34.5)
MCHC RBC AUTO-ENTMCNC: 32.8 G/DL (ref 33.2–36.2)
MCV RBC AUTO: 95.7 FL (ref 81–97)
MD: NO
MONOCYTES # BLD AUTO: 0.39 X10^3/UL (ref 0.2–0.8)
MONOCYTES NFR BLD AUTO: 11 % (ref 2–9)
NEUTROPHILS # BLD AUTO: 2.09 X10^3/UL (ref 1.8–6.8)
NEUTROPHILS NFR BLD AUTO: 57 % (ref 42–75)
PLATELET # BLD AUTO: 103 X10^3/UL (ref 130–400)
PMV BLD AUTO: 7.2 FL (ref 7.4–10.4)
PROT SERPL-MCNC: 7 G/DL (ref 6.4–8.2)
PROTHROMBIN TIME: 11.8 SECONDS (ref 9.6–11.5)
RBC # BLD AUTO: 4.35 X10^6/UL (ref 4.38–5.82)

## 2020-08-27 PROCEDURE — 93005 ELECTROCARDIOGRAM TRACING: CPT

## 2020-08-27 PROCEDURE — 85730 THROMBOPLASTIN TIME PARTIAL: CPT

## 2020-08-27 PROCEDURE — 80053 COMPREHEN METABOLIC PANEL: CPT

## 2020-08-27 PROCEDURE — 99284 EMERGENCY DEPT VISIT MOD MDM: CPT

## 2020-08-27 PROCEDURE — 85610 PROTHROMBIN TIME: CPT

## 2020-08-27 PROCEDURE — 80307 DRUG TEST PRSMV CHEM ANLYZR: CPT

## 2020-08-27 PROCEDURE — 85025 COMPLETE CBC W/AUTO DIFF WBC: CPT

## 2020-08-27 PROCEDURE — 36415 COLL VENOUS BLD VENIPUNCTURE: CPT

## 2020-08-27 PROCEDURE — 82140 ASSAY OF AMMONIA: CPT

## 2020-08-27 NOTE — NUR
BIB [facility] FOR C/O DIZZINESS STARTED TODAY AT 0600. STATES HE DOES NOT GET DIZZY 
WHEN LAYING FLAT/SITTING. HAD HAD SIMILAR DIZZY SPELLS IN THE PAST. VS PTA BP 
120/70, HR 77, RR 16,95% RA, . ETOH USE DAILY. PT WORRIED ABOUT [facility]MONIA 
LEVELS. HAS HC CIRRHOSIS AND END STAGE LIVER DISEASE. PT LAST DRINK TODAY HAD 
ONE SHOT AT 0945. PT RESTING ON GURNEY. NADN. VSS. MONITORS APPLIED. EKG 
COMPLETED.